# Patient Record
Sex: FEMALE | Race: WHITE | NOT HISPANIC OR LATINO | URBAN - METROPOLITAN AREA
[De-identification: names, ages, dates, MRNs, and addresses within clinical notes are randomized per-mention and may not be internally consistent; named-entity substitution may affect disease eponyms.]

---

## 2017-02-03 ENCOUNTER — OFFICE VISIT - RIVER FALLS (OUTPATIENT)
Dept: FAMILY MEDICINE | Facility: CLINIC | Age: 27
End: 2017-02-03

## 2017-09-18 ENCOUNTER — AMBULATORY - RIVER FALLS (OUTPATIENT)
Dept: FAMILY MEDICINE | Facility: CLINIC | Age: 27
End: 2017-09-18

## 2022-02-11 VITALS — HEART RATE: 93 BPM | WEIGHT: 162.8 LBS | SYSTOLIC BLOOD PRESSURE: 120 MMHG | DIASTOLIC BLOOD PRESSURE: 80 MMHG

## 2022-02-16 NOTE — PROGRESS NOTES
Patient:   KAYLEEN CORRALES            MRN: [id number]            FIN: [id number]               Age:   26 years     Sex:  Female     :  1990   Associated Diagnoses:   Encounter for other general counseling and advice on contraception   Author:   Justyn Pham MD      Visit Information      Date of Service: 2017 09:05 am  Performing Location: [facility]  Encounter#: [id number]      Primary Care Provider (PCP):  97 -UNKNOWN,      Referring Provider:  No referring provider recorded for selected visit.      Chief Complaint   2/3/2017 9:11 AM CST     Here for IUD removal.      Interval History   The patient is in today requesting Mirena removal.  She has had it for a year and a half and being off OCP she has had return of some of her symptoms which she experienced as a teenager with some facial and chest hair growth as well as acne.  She was placed on the pill as a teenager because of these symptoms and was also placed on spironolactone but did not take that for very long after starting the pill and did okay regarding the hirsutism.  She has no other concerns.  She is uncertain about future pregnancies but definitely would like to have birth control as well      Review of Systems   Review  of systems is negative except as documented under interval history.      Health Status   Allergies:    Allergic Reactions (Selected)  No known allergies   Medications:  (Selected)   Prescriptions  Prescribed  Ortho Tri-Cyclen Lo oral tablet: 1 tab(s), PO, Daily, # 84 tab(s), 3 Refill(s), Type: Maintenance, [facility] tab(s) po daily  Documented Medications  Documented  Mirena 52 mg intrauteral device: 1 EA ( 52 mg ), intrauteral, once, 0 Refill(s), Type: Maintenance   Problem list:    All Problems  Migraine Headache / ICD-9-.90 / Confirmed  Menarche / ICD-9-CM V21.8 / Confirmed  ASCUS on PAP Smear / ICD-9-.01 / Confirmed  History of PCOS / SNOMED CT 747661607  / Confirmed  Resolved: Varicella NOS / ICD-9-.9  Resolved: Pregnant / SNOMED CT 755021111      Histories   Past Medical History:    Active  ASCUS on PAP Smear (795.01): Onset on 12/4/2008 at 18 years.  Migraine Headache (346.90): Onset in 2006 at 15 years.  Menarche (V21.8): Onset in 2000 at 10 years.  History of PCOS (953915452)  Resolved  Varicella NOS (052.9):  Resolved.   Family History:    Fibromyalgia Syndrome  Mother (Shannon)     Procedure history:    Mirena IUD insertion on 11/2/2015 at 25 Years.  Comments:  11/2/2015 9:55 AM - Manolo Joshua CABRALbi  Removal due 11/2/2020. Lot# [id number] Exp: 04/18.  Biopsy of lesion of skin ([id number]) on 3/27/2015 at 24 Years.  Comments:  3/31/2015 2:53 PM - Susannah Yap  Right leg  Myringotomy and insertion of T tube ([id number]) in the month of 3/1992 at 16 Months.   Social History:        Alcohol Assessment: Current                     Comments:                      02/11/2014 - Genaro CABRALYanique A                     Social drinker, once q 3 months or so      Tobacco Assessment: Denies Tobacco Use            Never      Substance Abuse Assessment: Denies Substance Abuse      Home and Environment Assessment            Marital status: Single.      Nutrition and Health Assessment            Type of diet: Regular.      Exercise and Physical Activity Assessment: Occasional exercise            Exercise frequency: 3-4 times/week.      Sexual Assessment            Sexually active: Yes.  Sexual orientation: Heterosexual.  Uses condoms: Yes.        Physical Examination   Vital Signs   2/3/2017 9:11 AM CST Peripheral Pulse Rate 93 bpm    Systolic Blood Pressure 120 mmHg    Diastolic Blood Pressure 80 mmHg    Mean Arterial Pressure 93 mmHg      Gynecologic:  The Mirena IUD was removed..       Impression and Plan   Diagnosis     Encounter for other general counseling and advice on contraception ([id number] Z30.09).     Contraceptive change.  Hirsutism..     Plan:  OCP was  sent to [facility].  She will begin that right away.  She is not interested in spironolactone at this point since the hirsutism is mild.  She will get back to us if it becomes more of an issue.  She will return p.r.n..    Patient Instructions:       Counseled: Patient, Regarding diagnosis, Regarding treatment, Regarding medications, Verbalized understanding.